# Patient Record
Sex: FEMALE | Race: WHITE | NOT HISPANIC OR LATINO | Employment: UNEMPLOYED | URBAN - METROPOLITAN AREA
[De-identification: names, ages, dates, MRNs, and addresses within clinical notes are randomized per-mention and may not be internally consistent; named-entity substitution may affect disease eponyms.]

---
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
## 2024-12-06 ENCOUNTER — OCCMED (OUTPATIENT)
Age: 24
End: 2024-12-06

## 2024-12-06 DIAGNOSIS — Z02.1 ENCOUNTER FOR PRE-EMPLOYMENT EXAMINATION: Primary | ICD-10-CM

## 2024-12-06 LAB
MEV IGG SER QL IA: NORMAL
MUV IGG SER QL IA: NORMAL
VZV IGG SER QL IA: ABNORMAL

## 2024-12-06 PROCEDURE — 86480 TB TEST CELL IMMUN MEASURE: CPT | Performed by: EMERGENCY MEDICINE

## 2024-12-06 PROCEDURE — 86787 VARICELLA-ZOSTER ANTIBODY: CPT | Performed by: EMERGENCY MEDICINE

## 2024-12-06 PROCEDURE — 86765 RUBEOLA ANTIBODY: CPT | Performed by: EMERGENCY MEDICINE

## 2024-12-06 PROCEDURE — 86735 MUMPS ANTIBODY: CPT | Performed by: EMERGENCY MEDICINE

## 2024-12-07 LAB
GAMMA INTERFERON BACKGROUND BLD IA-ACNC: 0.05 IU/ML
M TB IFN-G BLD-IMP: NEGATIVE
M TB IFN-G CD4+ BCKGRND COR BLD-ACNC: 0.01 IU/ML
M TB IFN-G CD4+ BCKGRND COR BLD-ACNC: 0.02 IU/ML
MITOGEN IGNF BCKGRD COR BLD-ACNC: 9.95 IU/ML